# Patient Record
Sex: FEMALE | Race: BLACK OR AFRICAN AMERICAN | NOT HISPANIC OR LATINO | URBAN - METROPOLITAN AREA
[De-identification: names, ages, dates, MRNs, and addresses within clinical notes are randomized per-mention and may not be internally consistent; named-entity substitution may affect disease eponyms.]

---

## 2021-01-27 ENCOUNTER — OFFICE VISIT (OUTPATIENT)
Dept: URBAN - METROPOLITAN AREA CLINIC 27 | Facility: CLINIC | Age: 41
End: 2021-01-27

## 2021-01-30 ENCOUNTER — LAB OUTSIDE AN ENCOUNTER (OUTPATIENT)
Dept: URBAN - METROPOLITAN AREA CLINIC 121 | Facility: CLINIC | Age: 41
End: 2021-01-30

## 2021-01-30 LAB
A/G RATIO: (no result)
ALBUMIN: 4.3
ALK PHOS: 59
ANTI-HBC: REACTIVE
BASOPH COUNT: (no result)
BASOPHIL %: 0.6
BG RANDOM: 106
BILI TOTAL: 0.3
BUN/CREAT: (no result)
BUN: 10
CALCIUM: 9.5
CHLORIDE: 104
CO2: 29
CREATININE: 0.9
EOS COUNT: (no result)
EOSINOPHIL %: 1.3
GLOBULIN TOT: (no result)
HBSAG: (no result)
HCT: 35.7
HGB: 11.9
LYMPHS %: 27.7
MCH: 28.2
MCHC: (no result)
MCV: 84.6
MONOCYTE %: 4.9
MONOSCT AUTO: (no result)
PLATELETS: (no result)
PMN %: 65.5
POTASSIUM: 4.2
PROTEIN, TOT: 6.6
RBC: (no result)
RDW: 14.5
SGOT (AST): 14
SGPT (ALT): 9
WBC: (no result)
ZZ-GE-UNK: (no result)
ZZ-GE-UNK: REACTIVE
ZZ-GE-UNK: REACTIVE

## 2022-04-30 ENCOUNTER — TELEPHONE ENCOUNTER (OUTPATIENT)
Dept: URBAN - METROPOLITAN AREA CLINIC 121 | Facility: CLINIC | Age: 42
End: 2022-04-30

## 2022-04-30 RX ORDER — LISINOPRIL 2.5 MG/1
TABLET ORAL
OUTPATIENT
Start: 2014-08-12 | End: 2016-03-16

## 2022-04-30 RX ORDER — LISINOPRIL 2.5 MG/1
TABLET ORAL
OUTPATIENT
Start: 2014-08-12

## 2022-04-30 RX ORDER — METOPROLOL TARTRATE 100 MG/1
TABLET, FILM COATED ORAL
OUTPATIENT
Start: 2014-08-12

## 2022-04-30 RX ORDER — SPIRONOLACTONE 50 MG/1
TABLET, FILM COATED ORAL
OUTPATIENT
Start: 2014-08-12 | End: 2016-03-16

## 2022-04-30 RX ORDER — METOPROLOL TARTRATE 100 MG/1
TABLET, FILM COATED ORAL
OUTPATIENT
Start: 2014-08-12 | End: 2016-03-16

## 2022-04-30 RX ORDER — SPIRONOLACTONE 50 MG/1
TABLET, FILM COATED ORAL
OUTPATIENT
Start: 2014-08-12

## 2022-05-01 ENCOUNTER — TELEPHONE ENCOUNTER (OUTPATIENT)
Dept: URBAN - METROPOLITAN AREA CLINIC 121 | Facility: CLINIC | Age: 42
End: 2022-05-01

## 2022-05-01 RX ORDER — NIACIN 500 MG
TABLET, EXTENDED RELEASE ORAL
Status: ACTIVE | COMMUNITY
Start: 2021-01-27

## 2022-05-01 RX ORDER — OMEGA-3S/DHA/EPA/FISH OIL 300-1000MG
CAPSULE ORAL
Status: ACTIVE | COMMUNITY
Start: 2021-01-27

## 2022-05-01 RX ORDER — ECHINACEA 400 MG
CAPSULE ORAL
Status: ACTIVE | COMMUNITY
Start: 2021-01-27

## 2022-05-01 RX ORDER — TENOFOVIR DISOPROXIL FUMARATE 300 MG/1
1 TABLET PO QD TABLET, COATED ORAL
Status: ACTIVE | COMMUNITY
Start: 2014-08-12

## 2022-05-01 RX ORDER — MULTIVIT-MIN/FOLIC/VIT K/LYCOP 400-300MCG
TABLET ORAL
Status: ACTIVE | COMMUNITY
Start: 2021-01-27

## 2022-05-01 RX ORDER — VIT C/HESPERIDIN/BIOFLAVONOIDS 500-100 MG
TABLET ORAL
Status: ACTIVE | COMMUNITY
Start: 2021-01-27

## 2022-05-01 RX ORDER — CHROMIUM 200 MCG
TABLET ORAL
Status: ACTIVE | COMMUNITY
Start: 2021-01-27

## 2022-09-23 ENCOUNTER — DASHBOARD ENCOUNTERS (OUTPATIENT)
Age: 42
End: 2022-09-23

## 2022-09-23 ENCOUNTER — OFFICE VISIT (OUTPATIENT)
Dept: URBAN - METROPOLITAN AREA CLINIC 27 | Facility: CLINIC | Age: 42
End: 2022-09-23
Payer: OTHER GOVERNMENT

## 2022-09-23 ENCOUNTER — LAB OUTSIDE AN ENCOUNTER (OUTPATIENT)
Dept: URBAN - METROPOLITAN AREA CLINIC 27 | Facility: CLINIC | Age: 42
End: 2022-09-23

## 2022-09-23 ENCOUNTER — WEB ENCOUNTER (OUTPATIENT)
Dept: URBAN - METROPOLITAN AREA CLINIC 27 | Facility: CLINIC | Age: 42
End: 2022-09-23

## 2022-09-23 VITALS
DIASTOLIC BLOOD PRESSURE: 100 MMHG | HEIGHT: 62 IN | HEART RATE: 79 BPM | BODY MASS INDEX: 25.4 KG/M2 | SYSTOLIC BLOOD PRESSURE: 156 MMHG | WEIGHT: 138 LBS

## 2022-09-23 DIAGNOSIS — B18.1 CHRONIC VIRAL HEPATITIS B WITHOUT DELTA-AGENT: ICD-10-CM

## 2022-09-23 PROCEDURE — 99213 OFFICE O/P EST LOW 20 MIN: CPT | Performed by: INTERNAL MEDICINE

## 2022-09-23 RX ORDER — OMEGA-3S/DHA/EPA/FISH OIL 300-1000MG
CAPSULE ORAL
Status: ACTIVE | COMMUNITY
Start: 2021-01-27

## 2022-09-23 RX ORDER — MULTIVIT-MIN/FOLIC/VIT K/LYCOP 400-300MCG
TABLET ORAL
Status: ACTIVE | COMMUNITY
Start: 2021-01-27

## 2022-09-23 RX ORDER — ECHINACEA 400 MG
CAPSULE ORAL
Status: ACTIVE | COMMUNITY
Start: 2021-01-27

## 2022-09-23 RX ORDER — TENOFOVIR DISOPROXIL FUMARATE 300 MG/1
1 TABLET PO QD TABLET, COATED ORAL
Status: ACTIVE | COMMUNITY
Start: 2014-08-12

## 2022-09-23 RX ORDER — CHROMIUM 200 MCG
TABLET ORAL
Status: ACTIVE | COMMUNITY
Start: 2021-01-27

## 2022-09-23 RX ORDER — NIACIN 500 MG
TABLET, EXTENDED RELEASE ORAL
Status: ACTIVE | COMMUNITY
Start: 2021-01-27

## 2022-09-23 RX ORDER — VIT C/HESPERIDIN/BIOFLAVONOIDS 500-100 MG
TABLET ORAL
Status: ACTIVE | COMMUNITY
Start: 2021-01-27

## 2022-09-23 NOTE — HPI-TODAY'S VISIT:
Mrs. Cruz presents today for routine follow-up.  She has a history of hepatitis B virus and sustained virologic cure by taking Viread in the past.  She was just curious to what her labs look like today.  Otherwise, she had no other GI complaints.

## 2022-09-27 LAB
HEP BE AB: REACTIVE
HEP BE AG: (no result)
HEPATITIS B CORE AB TOTAL: REACTIVE
HEPATITIS B SURFACE ANTIBODY QL: REACTIVE
HEPATITIS B SURFACE ANTIGEN: (no result)

## 2024-04-01 ENCOUNTER — OV EP (OUTPATIENT)
Dept: URBAN - METROPOLITAN AREA CLINIC 27 | Facility: CLINIC | Age: 44
End: 2024-04-01

## 2024-09-04 ENCOUNTER — OFFICE VISIT (OUTPATIENT)
Dept: URBAN - METROPOLITAN AREA CLINIC 27 | Facility: CLINIC | Age: 44
End: 2024-09-04
Payer: COMMERCIAL

## 2024-09-04 ENCOUNTER — LAB OUTSIDE AN ENCOUNTER (OUTPATIENT)
Dept: URBAN - METROPOLITAN AREA CLINIC 27 | Facility: CLINIC | Age: 44
End: 2024-09-04

## 2024-09-04 VITALS
WEIGHT: 144 LBS | DIASTOLIC BLOOD PRESSURE: 80 MMHG | SYSTOLIC BLOOD PRESSURE: 132 MMHG | HEIGHT: 62 IN | BODY MASS INDEX: 26.5 KG/M2 | HEART RATE: 54 BPM

## 2024-09-04 DIAGNOSIS — Z12.11 COLON CANCER SCREENING: ICD-10-CM

## 2024-09-04 PROCEDURE — 99213 OFFICE O/P EST LOW 20 MIN: CPT | Performed by: INTERNAL MEDICINE

## 2024-09-04 RX ORDER — ECHINACEA 400 MG
CAPSULE ORAL
Status: ACTIVE | COMMUNITY
Start: 2021-01-27

## 2024-09-04 RX ORDER — TENOFOVIR DISOPROXIL FUMARATE 300 MG/1
1 TABLET PO QD TABLET, COATED ORAL
Status: ACTIVE | COMMUNITY
Start: 2014-08-12

## 2024-09-04 RX ORDER — VIT C/HESPERIDIN/BIOFLAVONOIDS 500-100 MG
TABLET ORAL
Status: ACTIVE | COMMUNITY
Start: 2021-01-27

## 2024-09-04 RX ORDER — OMEGA-3S/DHA/EPA/FISH OIL 300-1000MG
CAPSULE ORAL
Status: ACTIVE | COMMUNITY
Start: 2021-01-27

## 2024-09-04 RX ORDER — NIACIN 500 MG
TABLET, EXTENDED RELEASE ORAL
Status: ACTIVE | COMMUNITY
Start: 2021-01-27

## 2024-09-04 RX ORDER — CHROMIUM 200 MCG
TABLET ORAL
Status: ACTIVE | COMMUNITY
Start: 2021-01-27

## 2024-09-04 RX ORDER — MULTIVIT-MIN/FOLIC/VIT K/LYCOP 400-300MCG
TABLET ORAL
Status: ACTIVE | COMMUNITY
Start: 2021-01-27

## 2024-09-04 NOTE — HPI-TODAY'S VISIT:
Mrs. Cruz presents today per referral by Sandra Elizalde NP for colorectal cancer screening.  She denies ever having a colonoscopy.  She denies any family history of colon cancer or polyps.  She denies any current gastrointestinal issues.

## 2024-10-04 ENCOUNTER — CLAIMS CREATED FROM THE CLAIM WINDOW (OUTPATIENT)
Dept: URBAN - METROPOLITAN AREA SURGERY CENTER 7 | Facility: SURGERY CENTER | Age: 44
End: 2024-10-04
Payer: COMMERCIAL

## 2024-10-04 DIAGNOSIS — Z12.11 COLON CANCER SCREENING: ICD-10-CM

## 2024-10-04 DIAGNOSIS — Z12.11 COLON CANCER SCREENING (HIGH RISK): ICD-10-CM

## 2024-10-04 PROCEDURE — 0528F RCMND FLW-UP 10 YRS DOCD: CPT | Performed by: INTERNAL MEDICINE

## 2024-10-04 PROCEDURE — 45378 DIAGNOSTIC COLONOSCOPY: CPT | Performed by: INTERNAL MEDICINE

## 2024-10-04 PROCEDURE — 00812 ANES LWR INTST SCR COLSC: CPT | Performed by: NURSE ANESTHETIST, CERTIFIED REGISTERED

## 2024-10-04 RX ORDER — NIACIN 500 MG
TABLET, EXTENDED RELEASE ORAL
Status: ACTIVE | COMMUNITY
Start: 2021-01-27

## 2024-10-04 RX ORDER — MULTIVIT-MIN/FOLIC/VIT K/LYCOP 400-300MCG
TABLET ORAL
Status: ACTIVE | COMMUNITY
Start: 2021-01-27

## 2024-10-04 RX ORDER — CHROMIUM 200 MCG
TABLET ORAL
Status: ACTIVE | COMMUNITY
Start: 2021-01-27

## 2024-10-04 RX ORDER — VIT C/HESPERIDIN/BIOFLAVONOIDS 500-100 MG
TABLET ORAL
Status: ACTIVE | COMMUNITY
Start: 2021-01-27

## 2024-10-04 RX ORDER — TENOFOVIR DISOPROXIL FUMARATE 300 MG/1
1 TABLET PO QD TABLET, COATED ORAL
Status: ACTIVE | COMMUNITY
Start: 2014-08-12

## 2024-10-04 RX ORDER — ECHINACEA 400 MG
CAPSULE ORAL
Status: ACTIVE | COMMUNITY
Start: 2021-01-27

## 2024-10-04 RX ORDER — OMEGA-3S/DHA/EPA/FISH OIL 300-1000MG
CAPSULE ORAL
Status: ACTIVE | COMMUNITY
Start: 2021-01-27